# Patient Record
Sex: FEMALE | Race: WHITE
[De-identification: names, ages, dates, MRNs, and addresses within clinical notes are randomized per-mention and may not be internally consistent; named-entity substitution may affect disease eponyms.]

---

## 2018-02-13 ENCOUNTER — HOSPITAL ENCOUNTER (OUTPATIENT)
Dept: HOSPITAL 45 - C.MAMM | Age: 53
Discharge: HOME | End: 2018-02-13
Attending: FAMILY MEDICINE
Payer: COMMERCIAL

## 2018-02-13 DIAGNOSIS — Z12.31: Primary | ICD-10-CM

## 2018-02-13 NOTE — MAMMOGRAPHY REPORT
BILATERAL DIGITAL SCREENING MAMMOGRAM TOMOSYNTHESIS WITH CAD: 2/13/2018

CLINICAL HISTORY: Routine screening.  Patient has no complaints.  





TECHNIQUE:  Breast tomosynthesis in addition to standard 2D mammography was performed. Current study 
was also evaluated with a Computer Aided Detection (CAD) system.  



COMPARISON: Comparison is made to exams dated:  1/6/2015 ultrasound, 1/6/2015 mammogram, 12/23/2014 m
ammogram, 11/23/2012 mammogram, 11/8/2011 mammogram, and 3/9/2010 mammogram - [facility]
enter.   



BREAST COMPOSITION:  There are scattered areas of fibroglandular density in both breasts.  



FINDINGS:  There is fluctuating nodularity in the breasts as well as involutional changes comparing t
o prior mammograms.  There is a newly visualized 7 mm partially circumscribed mass in the 12:00 anter
ior left breast, that could represent a cyst although definitive characterization with targeted ultra
sound and possible additional mammographic views is recommended.



A previously observed cyst in the 4:00 left breast has decreased in size comparing to the prior 2014 
mammograms and 2015 ultrasound.



No other suspicious mass, architectural distortion or cluster of microcalcifications is seen bilatera
lly.  



IMPRESSION:  ACR BI-RADS CATEGORY 0: INCOMPLETE EVALUATION:  NEED ADDITIONAL IMAGING EVALUATION

The newly visualized 7 mm partially circumscribed mass in the 12:00 anterior left breast needs additi
onal evaluation.  

The patient will be called to schedule an appointment.  





Approximately 10% of breast cancers are not detected with mammography. A negative mammographic report
 should not delay biopsy if a clinically suggestive mass is present.



Chanel Nesbitt M.D.          

ay/:2/13/2018 12:47:32  



Imaging Technologist: Leena HONG(IGOR)(CARMEN), [facility]

letter sent: Addl Imaging 0  

BI-RADS Code: ACR BI-RADS Category 0: Incomplete Evaluation:  Need Additional Imaging Evaluation

## 2018-02-26 ENCOUNTER — HOSPITAL ENCOUNTER (OUTPATIENT)
Dept: HOSPITAL 45 - C.MAMM | Age: 53
Discharge: HOME | End: 2018-02-26
Attending: FAMILY MEDICINE
Payer: COMMERCIAL

## 2018-02-26 DIAGNOSIS — N60.02: Primary | ICD-10-CM

## 2018-02-27 NOTE — MAMMOGRAPHY REPORT
UNILATERAL LEFT DIGITAL DIAGNOSTIC MAMMOGRAM TOMOSYNTHESIS AND TARGETED LEFT ULTRASOUND: 2/26/2018

CLINICAL HISTORY: Callback from screening mammography for a partially circumscribed 7 mm mass in the 
12:00 left breast.  Patient has a history of prior breast cysts.  





TECHNIQUE: Spot compression left CC and MLO tomosynthesis images were obtained.  



COMPARISON: Comparison is made to exams dated:  11/8/2011 mammogram, 11/23/2012 mammogram, 12/23/2014
 mammogram, 1/6/2015 mammogram, 2/13/2018 mammogram, and 2/26/2018 ultrasound - [facility].   



BREAST COMPOSITION:  There are scattered areas of fibroglandular density in the left breast.  



FINDINGS: First targeted ultrasound was performed in the 12:00 left breast and extending throughout t
he majority of the superior, retroareolar and 6:00 left breast to assess for the mammographic mass in
 the 12:00 anterior left breast, measuring approximately 7 mm.  On ultrasound, there is a small oval 
hypoechoic cystic appearing mass, parallel in orientation in the 3:00 periareolar left breast, measur
ing 2.8 x 1.5 x 3.3 mm.  This probable cyst is smaller than the mammographic finding and also does no
t correlate in expected location, likely incidentally identified.  No other discrete solid or cystic 
mass identified, with particular attention to the 12:00 axis.



Therefore additional spot compression tomosynthesis views of the left breast were obtained to assess 
if the mass persists today.  These additional spot compression tomosynthesis views demonstrate the ma
ss does persist.  It is low in density, oval to reniform in shape with circumscribed borders, measuri
ng 7.8 x 4.5 x 5.3 mm.  No associated architectural distortion or calcification.  Targeted ultrasound
 was again performed.  A previously observed cyst in the 4:00 left breast has decreased in size alba
ring to prior mammograms.



Additional targeted ultrasound performed in the 12:00 and retroareolar breast demonstrate a circumscr
ibed oval isoechoic mass versus normal fat lobule in the 12:00 axis, 3 cm from the nipple, measuring 
7.8 x 2.4 x 5.3 mm.  This could represent an isoechoic fibroadenoma or normal fat lobule.  The locati
on does seem to correlate with the mammographic finding.  Given the benign mammographic and sonograph
ic features, a short interval follow-up left diagnostic mammogram and repeat targeted ultrasound is r
ecommended to ensure stability in 6 months.





IMPRESSION:  ACR-BI-RADS CATEGORY 3: PROBABLY BENIGN, TARGETED ULTRASOUND ACR-BI-RADS CATEGORY 3: PRO
BABLY BENIGN 

1.  There is a persistent oval to reniform circumscribed 7.8 mm mass in the 12:00 left anterior breas
t mammographically, with possible sonographic correlate in the 12:00 left breast, 3 cm from the nippl
e, which could represent an isoechoic fibroadenoma or normal fat lobule.  Given the benign imaging fe
atures both mammographically and sonographically, but conspicuous nature of this finding comparing to
 more remote mammograms, a short interval follow-up left diagnostic tomosynthesis mammogram and repea
t targeted ultrasound is recommended to ensure stability in 6 months.



These results and recommended as were discussed with the patient at the time of the exam.



Approximately 10% of breast cancers are not detected with mammography. A negative mammographic report
 should not delay biopsy if a clinically suggestive mass is present.



Chanel Nesbitt M.D.          

ay/:2/26/2018 12:19:36  



Imaging Technologist: Afshan HONG(IGOR)(CARMEN), [facility]

letter sent: Follow Up Recommended 3  

BI-RADS Code: ACR-BI-RADS Category 3: Probably Benign  Ultrasound BI-RADS: ACR-BI-RADS Category 3: Pr
obably Benign

## 2018-03-03 NOTE — CODING QUERY NO DIAGNOSIS
TREATMENT RENDERED WITHOUT A DIAGNOSIS                                                  



To promote full compliance with coding requirements relating to patient care, physician 
participation is requested in all cases of  uncertainty.  Please assist us with 
providing a diagnosis/symptom for the test(s) below:



A diagnosis/symptom was not documented on your Order.  A valid diagnosis/symptom is 
required to bill all insurances.



**Please remember that we are unable to code a diagnosis of rule out, probable, possible, 
questionable, or suspected.  



Tests that require a diagnosis:



DOS: 2/26/18

* UNILATERAL LEFT DIGITAL DIAGNOSTIC MAMMOGRAM TOMOSYNTHESIS AND TARGETED LEFT ULTRASOUND  
    DIAGNOSIS:





Provider Signature: _____________________________ Date: _________



Thank you  

Henny Arias

[facility] Information Management

Phone:  [phone]

Fax:  [phone]



Once completed, please kindly fax back to [phone]



For questions please call [phone]